# Patient Record
Sex: MALE | URBAN - METROPOLITAN AREA
[De-identification: names, ages, dates, MRNs, and addresses within clinical notes are randomized per-mention and may not be internally consistent; named-entity substitution may affect disease eponyms.]

---

## 2021-09-09 ENCOUNTER — OFFICE VISIT (OUTPATIENT)
Dept: URBAN - METROPOLITAN AREA CLINIC 16 | Facility: CLINIC | Age: 76
End: 2021-09-09
Payer: COMMERCIAL

## 2021-09-09 DIAGNOSIS — E11.9 TYPE 2 DIABETES MELLITUS W/O COMPLICATION: Primary | ICD-10-CM

## 2021-09-09 DIAGNOSIS — H25.12 AGE-RELATED NUCLEAR CATARACT, LEFT EYE: ICD-10-CM

## 2021-09-09 DIAGNOSIS — H52.4 PRESBYOPIA: ICD-10-CM

## 2021-09-09 DIAGNOSIS — H40.013 OPEN ANGLE WITH BORDERLINE FINDINGS, LOW RISK, BILATERAL: ICD-10-CM

## 2021-09-09 DIAGNOSIS — Z96.1 PRESENCE OF INTRAOCULAR LENS: ICD-10-CM

## 2021-09-09 PROCEDURE — 99203 OFFICE O/P NEW LOW 30 MIN: CPT | Performed by: OPTOMETRIST

## 2021-09-09 PROCEDURE — 92134 CPTRZ OPH DX IMG PST SGM RTA: CPT | Performed by: OPTOMETRIST

## 2021-09-09 ASSESSMENT — KERATOMETRY
OD: 42.25
OS: 42.25

## 2021-09-09 ASSESSMENT — VISUAL ACUITY
OS: 20/30
OD: 20/20

## 2021-09-09 ASSESSMENT — INTRAOCULAR PRESSURE
OD: 14
OS: 15

## 2021-09-09 NOTE — IMPRESSION/PLAN
Impression: Open angle with borderline findings, low risk, bilateral: H40.013. Plan: OAG suspect d/t large c/d ratio. IOP normal today. Return for baseline testing 4-6 months, including HVF 24-2, OCT-RNFL,  pachymetry. IOP check as well. No treatment at this time.

## 2021-09-09 NOTE — IMPRESSION/PLAN
Impression: Age-related nuclear cataract, left eye: H25.12. Plan: Cataracts account for the patient's complaints. Discussed options, surgery or spectacle change. Explained surgery risks, benefits, procedures and recovery. Patient defers surgery and elects to change glasses first. 
RTC 1 year x CEE, or sooner for refraction.

## 2021-09-09 NOTE — IMPRESSION/PLAN
Impression: Type 2 diabetes mellitus w/o complication: E27.5. Plan: No Non-Proliferative Diabetic Retinopathy, no Diabetic Macular Edema and no Neovascularization of the iris, disc, or elsewhere. OCT performed and reviewed today, no sign of diabetic macular edema OU. Discussed ocular and systemic benefits of blood sugar control. Send notes to PCP. Check annually. RTC 1 year x CEE.

## 2021-09-09 NOTE — IMPRESSION/PLAN
Impression: Presence of intraocular lens: Z96.1. Plan: Condition is stable, no further treatment at this time. Monitor.

## 2022-03-28 ENCOUNTER — OFFICE VISIT (OUTPATIENT)
Dept: URBAN - METROPOLITAN AREA CLINIC 16 | Facility: CLINIC | Age: 77
End: 2022-03-28
Payer: MEDICARE

## 2022-03-28 PROCEDURE — 92133 CPTRZD OPH DX IMG PST SGM ON: CPT | Performed by: OPTOMETRIST

## 2022-03-28 PROCEDURE — 99213 OFFICE O/P EST LOW 20 MIN: CPT | Performed by: OPTOMETRIST

## 2022-03-28 PROCEDURE — 92083 EXTENDED VISUAL FIELD XM: CPT | Performed by: OPTOMETRIST

## 2022-03-28 ASSESSMENT — INTRAOCULAR PRESSURE
OS: 15
OD: 15

## 2022-03-28 NOTE — IMPRESSION/PLAN
Impression: Open angle with borderline findings, low risk, bilateral: H40.013. HVF: trace defects OS, WNL OD
OCT: borderline OU Plan: Discussed condition w/pt. Testing performed and reviewed. Recommend regular IOP check w/yearly testing. RTC 6 months x CFM.